# Patient Record
Sex: FEMALE | Race: OTHER | NOT HISPANIC OR LATINO | URBAN - METROPOLITAN AREA
[De-identification: names, ages, dates, MRNs, and addresses within clinical notes are randomized per-mention and may not be internally consistent; named-entity substitution may affect disease eponyms.]

---

## 2020-03-01 ENCOUNTER — OUTPATIENT (OUTPATIENT)
Dept: OUTPATIENT SERVICES | Facility: HOSPITAL | Age: 55
LOS: 1 days | End: 2020-03-01

## 2020-03-10 RX ORDER — CIPROFLOXACIN LACTATE 400MG/40ML
1 VIAL (ML) INTRAVENOUS
Qty: 0 | Refills: 0 | DISCHARGE
Start: 2020-03-10

## 2020-03-18 ENCOUNTER — INPATIENT (INPATIENT)
Facility: HOSPITAL | Age: 55
LOS: 1 days | Discharge: HOME | End: 2020-03-20
Attending: STUDENT IN AN ORGANIZED HEALTH CARE EDUCATION/TRAINING PROGRAM | Admitting: STUDENT IN AN ORGANIZED HEALTH CARE EDUCATION/TRAINING PROGRAM
Payer: MEDICAID

## 2020-03-18 VITALS
OXYGEN SATURATION: 100 % | RESPIRATION RATE: 16 BRPM | HEART RATE: 100 BPM | DIASTOLIC BLOOD PRESSURE: 76 MMHG | TEMPERATURE: 99 F | SYSTOLIC BLOOD PRESSURE: 123 MMHG

## 2020-03-18 DIAGNOSIS — E03.9 HYPOTHYROIDISM, UNSPECIFIED: ICD-10-CM

## 2020-03-18 DIAGNOSIS — D72.825 BANDEMIA: ICD-10-CM

## 2020-03-18 DIAGNOSIS — D21.9 BENIGN NEOPLASM OF CONNECTIVE AND OTHER SOFT TISSUE, UNSPECIFIED: Chronic | ICD-10-CM

## 2020-03-18 DIAGNOSIS — Z90.49 ACQUIRED ABSENCE OF OTHER SPECIFIED PARTS OF DIGESTIVE TRACT: Chronic | ICD-10-CM

## 2020-03-18 DIAGNOSIS — Z29.9 ENCOUNTER FOR PROPHYLACTIC MEASURES, UNSPECIFIED: ICD-10-CM

## 2020-03-18 DIAGNOSIS — Z02.9 ENCOUNTER FOR ADMINISTRATIVE EXAMINATIONS, UNSPECIFIED: ICD-10-CM

## 2020-03-18 DIAGNOSIS — J18.9 PNEUMONIA, UNSPECIFIED ORGANISM: ICD-10-CM

## 2020-03-18 PROBLEM — Z00.00 ENCOUNTER FOR PREVENTIVE HEALTH EXAMINATION: Status: ACTIVE | Noted: 2020-03-18

## 2020-03-18 LAB
ALBUMIN SERPL ELPH-MCNC: 4.2 G/DL — SIGNIFICANT CHANGE UP (ref 3.3–5)
ALP SERPL-CCNC: 79 U/L — SIGNIFICANT CHANGE UP (ref 40–120)
ALT FLD-CCNC: 10 U/L — SIGNIFICANT CHANGE UP (ref 4–33)
ANION GAP SERPL CALC-SCNC: 15 MMO/L — HIGH (ref 7–14)
AST SERPL-CCNC: 23 U/L — SIGNIFICANT CHANGE UP (ref 4–32)
B PERT DNA SPEC QL NAA+PROBE: NOT DETECTED — SIGNIFICANT CHANGE UP
BASE EXCESS BLDV CALC-SCNC: 1.5 MMOL/L — SIGNIFICANT CHANGE UP
BASOPHILS # BLD AUTO: 0.01 K/UL — SIGNIFICANT CHANGE UP (ref 0–0.2)
BASOPHILS NFR BLD AUTO: 0.2 % — SIGNIFICANT CHANGE UP (ref 0–2)
BASOPHILS NFR SPEC: 0 % — SIGNIFICANT CHANGE UP (ref 0–2)
BILIRUB SERPL-MCNC: 0.4 MG/DL — SIGNIFICANT CHANGE UP (ref 0.2–1.2)
BLASTS # FLD: 0 % — SIGNIFICANT CHANGE UP (ref 0–0)
BLOOD GAS VENOUS - CREATININE: 1.1 MG/DL — SIGNIFICANT CHANGE UP (ref 0.5–1.3)
BUN SERPL-MCNC: 11 MG/DL — SIGNIFICANT CHANGE UP (ref 7–23)
C PNEUM DNA SPEC QL NAA+PROBE: NOT DETECTED — SIGNIFICANT CHANGE UP
CALCIUM SERPL-MCNC: 9.2 MG/DL — SIGNIFICANT CHANGE UP (ref 8.4–10.5)
CHLORIDE BLDV-SCNC: 104 MMOL/L — SIGNIFICANT CHANGE UP (ref 96–108)
CHLORIDE SERPL-SCNC: 100 MMOL/L — SIGNIFICANT CHANGE UP (ref 98–107)
CO2 SERPL-SCNC: 23 MMOL/L — SIGNIFICANT CHANGE UP (ref 22–31)
CREAT SERPL-MCNC: 0.9 MG/DL — SIGNIFICANT CHANGE UP (ref 0.5–1.3)
CRP SERPL-MCNC: 5.2 MG/L — HIGH
EOSINOPHIL # BLD AUTO: 0 K/UL — SIGNIFICANT CHANGE UP (ref 0–0.5)
EOSINOPHIL NFR BLD AUTO: 0 % — SIGNIFICANT CHANGE UP (ref 0–6)
EOSINOPHIL NFR FLD: 0 % — SIGNIFICANT CHANGE UP (ref 0–6)
ERYTHROCYTE [SEDIMENTATION RATE] IN BLOOD: 34 MM/HR — HIGH (ref 4–25)
FLUAV H1 2009 PAND RNA SPEC QL NAA+PROBE: NOT DETECTED — SIGNIFICANT CHANGE UP
FLUAV H1 RNA SPEC QL NAA+PROBE: NOT DETECTED — SIGNIFICANT CHANGE UP
FLUAV H3 RNA SPEC QL NAA+PROBE: NOT DETECTED — SIGNIFICANT CHANGE UP
FLUAV SUBTYP SPEC NAA+PROBE: NOT DETECTED — SIGNIFICANT CHANGE UP
FLUBV RNA SPEC QL NAA+PROBE: NOT DETECTED — SIGNIFICANT CHANGE UP
GAS PNL BLDV: 140 MMOL/L — SIGNIFICANT CHANGE UP (ref 136–146)
GIANT PLATELETS BLD QL SMEAR: PRESENT — SIGNIFICANT CHANGE UP
GLUCOSE BLDV-MCNC: 132 MG/DL — HIGH (ref 70–99)
GLUCOSE SERPL-MCNC: 134 MG/DL — HIGH (ref 70–99)
HADV DNA SPEC QL NAA+PROBE: NOT DETECTED — SIGNIFICANT CHANGE UP
HCO3 BLDV-SCNC: 26 MMOL/L — SIGNIFICANT CHANGE UP (ref 20–27)
HCOV PNL SPEC NAA+PROBE: SIGNIFICANT CHANGE UP
HCT VFR BLD CALC: 36.8 % — SIGNIFICANT CHANGE UP (ref 34.5–45)
HCT VFR BLDV CALC: 35.2 % — SIGNIFICANT CHANGE UP (ref 34.5–45)
HGB BLD-MCNC: 12.1 G/DL — SIGNIFICANT CHANGE UP (ref 11.5–15.5)
HGB BLDV-MCNC: 11.4 G/DL — LOW (ref 11.5–15.5)
HMPV RNA SPEC QL NAA+PROBE: NOT DETECTED — SIGNIFICANT CHANGE UP
HPIV1 RNA SPEC QL NAA+PROBE: NOT DETECTED — SIGNIFICANT CHANGE UP
HPIV2 RNA SPEC QL NAA+PROBE: NOT DETECTED — SIGNIFICANT CHANGE UP
HPIV3 RNA SPEC QL NAA+PROBE: NOT DETECTED — SIGNIFICANT CHANGE UP
HPIV4 RNA SPEC QL NAA+PROBE: NOT DETECTED — SIGNIFICANT CHANGE UP
IMM GRANULOCYTES NFR BLD AUTO: 0 % — SIGNIFICANT CHANGE UP (ref 0–1.5)
LACTATE BLDV-MCNC: 1.6 MMOL/L — SIGNIFICANT CHANGE UP (ref 0.5–2)
LYMPHOCYTES # BLD AUTO: 1.31 K/UL — SIGNIFICANT CHANGE UP (ref 1–3.3)
LYMPHOCYTES # BLD AUTO: 30.1 % — SIGNIFICANT CHANGE UP (ref 13–44)
LYMPHOCYTES NFR SPEC AUTO: 20.6 % — SIGNIFICANT CHANGE UP (ref 13–44)
MCHC RBC-ENTMCNC: 29.9 PG — SIGNIFICANT CHANGE UP (ref 27–34)
MCHC RBC-ENTMCNC: 32.9 % — SIGNIFICANT CHANGE UP (ref 32–36)
MCV RBC AUTO: 90.9 FL — SIGNIFICANT CHANGE UP (ref 80–100)
METAMYELOCYTES # FLD: 0 % — SIGNIFICANT CHANGE UP (ref 0–1)
MONOCYTES # BLD AUTO: 0.33 K/UL — SIGNIFICANT CHANGE UP (ref 0–0.9)
MONOCYTES NFR BLD AUTO: 7.6 % — SIGNIFICANT CHANGE UP (ref 2–14)
MONOCYTES NFR BLD: 3.9 % — SIGNIFICANT CHANGE UP (ref 2–9)
MYELOCYTES NFR BLD: 0 % — SIGNIFICANT CHANGE UP (ref 0–0)
NEUTROPHIL AB SER-ACNC: 52.9 % — SIGNIFICANT CHANGE UP (ref 43–77)
NEUTROPHILS # BLD AUTO: 2.7 K/UL — SIGNIFICANT CHANGE UP (ref 1.8–7.4)
NEUTROPHILS NFR BLD AUTO: 62.1 % — SIGNIFICANT CHANGE UP (ref 43–77)
NEUTS BAND # BLD: 14.7 % — HIGH (ref 0–6)
NRBC # FLD: 0 K/UL — SIGNIFICANT CHANGE UP (ref 0–0)
OTHER - HEMATOLOGY %: 0 — SIGNIFICANT CHANGE UP
PCO2 BLDV: 49 MMHG — SIGNIFICANT CHANGE UP (ref 41–51)
PH BLDV: 7.35 PH — SIGNIFICANT CHANGE UP (ref 7.32–7.43)
PLATELET # BLD AUTO: 219 K/UL — SIGNIFICANT CHANGE UP (ref 150–400)
PLATELET COUNT - ESTIMATE: NORMAL — SIGNIFICANT CHANGE UP
PMV BLD: 9.9 FL — SIGNIFICANT CHANGE UP (ref 7–13)
PO2 BLDV: 33 MMHG — LOW (ref 35–40)
POTASSIUM BLDV-SCNC: 3.1 MMOL/L — LOW (ref 3.4–4.5)
POTASSIUM SERPL-MCNC: 3.8 MMOL/L — SIGNIFICANT CHANGE UP (ref 3.5–5.3)
POTASSIUM SERPL-SCNC: 3.8 MMOL/L — SIGNIFICANT CHANGE UP (ref 3.5–5.3)
PROMYELOCYTES # FLD: 0 % — SIGNIFICANT CHANGE UP (ref 0–0)
PROT SERPL-MCNC: 7.4 G/DL — SIGNIFICANT CHANGE UP (ref 6–8.3)
RBC # BLD: 4.05 M/UL — SIGNIFICANT CHANGE UP (ref 3.8–5.2)
RBC # FLD: 13.5 % — SIGNIFICANT CHANGE UP (ref 10.3–14.5)
REVIEW TO FOLLOW: YES — SIGNIFICANT CHANGE UP
RSV RNA SPEC QL NAA+PROBE: NOT DETECTED — SIGNIFICANT CHANGE UP
RV+EV RNA SPEC QL NAA+PROBE: NOT DETECTED — SIGNIFICANT CHANGE UP
SAO2 % BLDV: 61.5 % — SIGNIFICANT CHANGE UP (ref 60–85)
SMUDGE CELLS # BLD: PRESENT — SIGNIFICANT CHANGE UP
SODIUM SERPL-SCNC: 138 MMOL/L — SIGNIFICANT CHANGE UP (ref 135–145)
VARIANT LYMPHS # BLD: 6.9 % — SIGNIFICANT CHANGE UP
WBC # BLD: 4.35 K/UL — SIGNIFICANT CHANGE UP (ref 3.8–10.5)
WBC # FLD AUTO: 4.35 K/UL — SIGNIFICANT CHANGE UP (ref 3.8–10.5)

## 2020-03-18 PROCEDURE — 71045 X-RAY EXAM CHEST 1 VIEW: CPT | Mod: 26

## 2020-03-18 PROCEDURE — 99223 1ST HOSP IP/OBS HIGH 75: CPT

## 2020-03-18 RX ORDER — LEVOTHYROXINE SODIUM 125 MCG
50 TABLET ORAL DAILY
Refills: 0 | Status: DISCONTINUED | OUTPATIENT
Start: 2020-03-18 | End: 2020-03-20

## 2020-03-18 RX ORDER — ACETAMINOPHEN 500 MG
650 TABLET ORAL EVERY 6 HOURS
Refills: 0 | Status: DISCONTINUED | OUTPATIENT
Start: 2020-03-18 | End: 2020-03-20

## 2020-03-18 RX ORDER — ALBUTEROL 90 UG/1
1 AEROSOL, METERED ORAL ONCE
Refills: 0 | Status: COMPLETED | OUTPATIENT
Start: 2020-03-18 | End: 2020-03-18

## 2020-03-18 RX ORDER — AZITHROMYCIN 500 MG/1
500 TABLET, FILM COATED ORAL ONCE
Refills: 0 | Status: COMPLETED | OUTPATIENT
Start: 2020-03-18 | End: 2020-03-18

## 2020-03-18 RX ORDER — ALBUTEROL 90 UG/1
2 AEROSOL, METERED ORAL EVERY 6 HOURS
Refills: 0 | Status: DISCONTINUED | OUTPATIENT
Start: 2020-03-18 | End: 2020-03-20

## 2020-03-18 RX ORDER — ACETAMINOPHEN 500 MG
650 TABLET ORAL ONCE
Refills: 0 | Status: COMPLETED | OUTPATIENT
Start: 2020-03-18 | End: 2020-03-18

## 2020-03-18 RX ORDER — AZITHROMYCIN 500 MG/1
500 TABLET, FILM COATED ORAL EVERY 24 HOURS
Refills: 0 | Status: DISCONTINUED | OUTPATIENT
Start: 2020-03-19 | End: 2020-03-20

## 2020-03-18 RX ORDER — CEFTRIAXONE 500 MG/1
1000 INJECTION, POWDER, FOR SOLUTION INTRAMUSCULAR; INTRAVENOUS EVERY 24 HOURS
Refills: 0 | Status: DISCONTINUED | OUTPATIENT
Start: 2020-03-19 | End: 2020-03-20

## 2020-03-18 RX ORDER — CEFTRIAXONE 500 MG/1
1000 INJECTION, POWDER, FOR SOLUTION INTRAMUSCULAR; INTRAVENOUS ONCE
Refills: 0 | Status: COMPLETED | OUTPATIENT
Start: 2020-03-18 | End: 2020-03-18

## 2020-03-18 RX ADMIN — Medication 650 MILLIGRAM(S): at 15:47

## 2020-03-18 RX ADMIN — CEFTRIAXONE 100 MILLIGRAM(S): 500 INJECTION, POWDER, FOR SOLUTION INTRAMUSCULAR; INTRAVENOUS at 19:00

## 2020-03-18 RX ADMIN — AZITHROMYCIN 255 MILLIGRAM(S): 500 TABLET, FILM COATED ORAL at 19:26

## 2020-03-18 RX ADMIN — ALBUTEROL 1 PUFF(S): 90 AEROSOL, METERED ORAL at 16:45

## 2020-03-18 RX ADMIN — Medication 650 MILLIGRAM(S): at 14:47

## 2020-03-18 RX ADMIN — ALBUTEROL 2 PUFF(S): 90 AEROSOL, METERED ORAL at 22:24

## 2020-03-18 NOTE — ED PROVIDER NOTE - PROGRESS NOTE DETAILS
Patient states she feels short of breath. Will try albuterol MDI. [place]: Pt. w/ ~15% bands Only able to walk 20 feet before feeling dizzy and closing eyes. Had to turn around. Will admit. Give Abx.

## 2020-03-18 NOTE — H&P ADULT - PROBLEM SELECTOR PLAN 3
- management as above  - daily CBC - management as above for R/O Viral pneumonia  - daily CBC with diff

## 2020-03-18 NOTE — ED ADULT NURSE NOTE - NSIMPLEMENTINTERV_GEN_ALL_ED
Implemented All Universal Safety Interventions:  [place] to call system. Call bell, personal items and telephone within reach. Instruct patient to call for assistance. Room bathroom lighting operational. Non-slip footwear when patient is off stretcher. Physically safe environment: no spills, clutter or unnecessary equipment. Stretcher in lowest position, wheels locked, appropriate side rails in place.

## 2020-03-18 NOTE — ED PROVIDER NOTE - OBJECTIVE STATEMENT
Patient is a 55 yo F with no chronic medical problems, non-smoker, here for fever and shortness of breath. + travel to [facility], returned 2/15/20. She developed fever and cough on Friday (6 days ago). + nausea, cough. She went to urgent care in [place] yesterday and was swabbed for COVID 19, was told to stay home for 7 days but now comes in because she cannot breath. She states she was started on antibiotics, but cannot recall the name.

## 2020-03-18 NOTE — H&P ADULT - PROBLEM SELECTOR PLAN 1
Patient presents with reported fever, SOB and bandemia and close contact with someone who is currently undergoing PNA treatment, concerning for viral PNA. RVP negative and CXR clear. s/p Azithro and CTX in the ED  - given bandemia of 14.7, will c/w Azithro and CTX for CAP  - f/u COVID PCR  - PRN NC O2  - PRN Tylenol for fever  - if febrile -> will obtain UA, UCx and BCx Patient presents with reported fever, SOB and bandemia and close contact with someone who is currently undergoing PNA treatment, concerning for viral PNA. RVP negative and CXR clear. s/p Azithro and CTX in the ED  - given bandemia of 14.7, will c/w Azithro and CTX for CAP  - f/u COVID PCR  - PRN NC O2  - PRN Ventolin for SOB  - PRN Tylenol for fever  - if febrile -> will obtain UA, UCx and BCx Patient presents with reported fever, SOB and bandemia and close contact with someone who is currently undergoing PNA treatment, concerning for viral PNA. RVP negative and CXR clear. s/p Azithro and CTX in the ED  - given bandemia of 14.7, will c/w Azithro and CTX for CAP  - f/u COVID PCR  - ordered blood culture, UA, UCx  - PRN NC O2  - PRN Ventolin for SOB  - PRN Tylenol for fever Patient presents with reported fevers, cough, SOB, bandemia, and close contact with someone who is currently undergoing PNA treatment, concerning for COVID-19. RVP negative and CXR clear. S/p Azithro and CTX in the ED.  - Given bandemia of 14.7, will c/w Azithro and CTX for CAP  - F/u COVID-19 testing  - Ordered blood cultures, UA, UCx  - F/u CT chest  - PRN NC O2, avoid HFNC and NPPV pending COVID-19 rule out. Monitor respiratory status closely.  - PRN Ventolin for SOB  - PRN Tylenol for fever  - PRN Tessalon Perle for cough  - Isolation precautions

## 2020-03-18 NOTE — H&P ADULT - ASSESSMENT
kaci is a 53 yo F with PMHx of hypothyroidism presents for shortness of breath, cough and reported fever, admitted for COVID rule out. Patient is a 55 yo F with PMHx of hypothyroidism presents for shortness of breath, cough, and reported fevers, admitted for COVID-19 rule out.

## 2020-03-18 NOTE — H&P ADULT - PROBLEM SELECTOR PLAN 4
DVT: SCD for now, low IMPROVE score  Diet: Regular DVT ppx: SCDs for now, low IMPROVE score  Diet: Regular

## 2020-03-18 NOTE — H&P ADULT - HISTORY OF PRESENT ILLNESS
Patient is a 53 yo F with no PMHx presents for fever and shortness of breath Patient is a 55 yo F with PMHx of hypothyroidism presents for shortness of breath. Reports that she started experiencing fever up to 100.4 since last Friday with associated cough. Went to urgent care at [place] and was swabbed for COVID, sent home with antibiotics (unclear which one) and was told to self quarantined. Reports worsening shortness of breath with minimal exertion today with associated nausea. Hence came to [facility] ED for further evaluation.   Reports that her  is currently hospitalized at [place] for pneumonia, and had similar symptoms has she does. Recent returned from [facility] on 2/15/20. Denies any symptoms while there.   ED vitals stable. Per ED attending, when attempted to walk the patient she became acutely short of breath and dizzy (was not able to get a saturation before she needed to go back to bed). Labs with bandemia 14/7%. CXR clear. RVP negative. ESR and CRP elevated. Admitted for COVID rule out. Patient is a 55 yo F with PMHx of hypothyroidism presents for shortness of breath. Reports that she started experiencing fever up to 100.4 since last Friday with associated cough. Went to urgent care at [place] and was swabbed for COVID-19, sent home with antibiotics (unclear which one) and was told to self quarantine. Reports worsening shortness of breath with minimal exertion today with associated nausea. Hence came to [facility] ED for further evaluation.   Reports that her  is currently hospitalized at [place] for pneumonia, and had similar symptoms as she does. Recent returned from [facility] on 2/15/20. Denies any symptoms while there.   ED vitals stable. Per ED attending, when attempted to walk the patient she became acutely short of breath and dizzy (was not able to get a saturation before she needed to go back to bed). Labs with bandemia 14.7%. CXR clear. RVP negative. ESR and CRP elevated. Admitted for COVID-19 rule out.

## 2020-03-18 NOTE — H&P ADULT - NSICDXPASTSURGICALHX_GEN_ALL_CORE_FT
PAST SURGICAL HISTORY:  History of cholecystectomy PAST SURGICAL HISTORY:  Fibroid     History of cholecystectomy

## 2020-03-18 NOTE — ED ADULT NURSE NOTE - OBJECTIVE STATEMENT
Receive pt. in ER Intake room 3 alert and oriented x 4, presenting to the ER with complaints of fever, cough, weakness and body aches. Pt. stated " I have been feeling sick for a week I went [facility] the doctor give me antibiotics but it is not working". Medicated as ordered, labs sent, pt. has intermittent non productive cough, will continue to monitor.

## 2020-03-18 NOTE — H&P ADULT - NSHPREVIEWOFSYSTEMS_GEN_ALL_CORE
REVIEW OF SYSTEMS:    CONSTITUTIONAL: No weakness, fevers or chills  EYES: No vertigo or throat pain  ENT: No visual changes, eye pain  MOUTH: moist winter mucosal, no mouth ulcers  NECK: No pain or stiffness  RESPIRATORY: No cough, wheezing, hemoptysis; No shortness of breath  CARDIOVASCULAR: No chest pain or palpitations  GASTROINTESTINAL: No abdominal or epigastric pain. No nausea, vomiting, or hematemesis; No diarrhea or constipation. No melena or hematochezia.  GENITOURINARY: No dysuria, frequency or hematuria  NEUROLOGICAL: No numbness or weakness  SKIN: No itching, rashes REVIEW OF SYSTEMS:    CONSTITUTIONAL: No weakness, + fevers or chills  EYES: No vertigo or throat pain  ENT: No visual changes, eye pain  MOUTH: moist winter mucosal, no mouth ulcers  NECK: No pain or stiffness  RESPIRATORY: + dry cough and shortness of breath  CARDIOVASCULAR: + chest tightness, no palpitations  GASTROINTESTINAL: + epigastric pain and nausea. No vomiting, or hematemesis; No diarrhea or constipation. No melena or hematochezia.  GENITOURINARY: No dysuria, frequency or hematuria  NEUROLOGICAL: No numbness or weakness  SKIN: No itching, rashes Constitutional: +Fevers and chills. No weakness or weight loss.   Eyes: No visual changes, double vision, or eye pain  Ears, Nose, Mouth, Throat: No runny nose, sinus pain, ear pain, tinnitus, sore throat, dysphagia, or odynophagia  Cardiovascular: +Chest tightness. No chest pain, palpitations, or LE edema.  Respiratory: +Nonproductive cough. +SOB. No wheezing or hemoptysis.  Gastrointestinal: +Epigastric pain and nausea. No vomiting, diarrhea/constipation, hematemesis, BRBPR, or melena.  Genitourinary: No dysuria, frequency, urgency, or hematuria  Musculoskeletal: No joint pain, joint swelling, or decreased ROM  Skin: No pruritus, rashes, lesions, or wounds  Neurologic: No seizures, headache, paresthesias, numbness, or limb weakness  Psychiatric: No depression, anxiety, or agitation  Endocrine: No heat/cold intolerance, mood swings, sweats, polydipsia, or polyuria  Hematologic/lymphatic: No purpura, petechia, or prolonged or excessive bleeding after dental extraction / injury  Allergic/Immunologic: No anaphylaxis or allergic response to materials, foods, animals    Positives and pertinent negatives noted and all other systems negative.

## 2020-03-18 NOTE — H&P ADULT - ATTENDING COMMENTS
53 yo F with PMHx of hypothyroidism presents for shortness of breath, cough, and reported fevers, admitted for COVID-19 rule out. On ceftriaxone and azithromycin given bandemia on presentation. F/u CT chest and COVID-19 testing.

## 2020-03-18 NOTE — ED ADULT TRIAGE NOTE - CHIEF COMPLAINT QUOTE
patient c/o fevers, cough , fatigue.  Had work up done at [facility] yesterday for same, had flu/corona swab sent.

## 2020-03-18 NOTE — H&P ADULT - PROBLEM SELECTOR PLAN 5
Dispo:   1.  Name of PCP:   2.  PCP Contacted on Admission: [ ] Y    [ ] N    3.  PCP contacted at Discharge: [ ] Y    [ ] N    [ ] N/A  4.  Post-Discharge Appointment Date and Location:  5.  Summary of Handoff given to PCP: Dispo:   1.  Name of PCP: Unknown  2.  PCP Contacted on Admission: [ ] Y    [X] N    3.  PCP contacted at Discharge: [ ] Y    [ ] N    [ ] N/A  4.  Post-Discharge Appointment Date and Location:  5.  Summary of Handoff given to PCP:

## 2020-03-18 NOTE — H&P ADULT - NSHPPHYSICALEXAM_GEN_ALL_CORE
Vital Signs Last 24 Hrs  T(C): 37.4 (18 Mar 2020 19:26), Max: 37.6 (18 Mar 2020 16:45)  T(F): 99.4 (18 Mar 2020 19:26), Max: 99.6 (18 Mar 2020 16:45)  HR: 88 (18 Mar 2020 19:26) (86 - 100)  BP: 131/66 (18 Mar 2020 19:26) (123/76 - 131/66)  BP(mean): --  RR: 16 (18 Mar 2020 19:26) (16 - 16)  SpO2: 99% (18 Mar 2020 19:26) (98% - 100%)    PHYSICAL EXAM:  GENERAL: NAD, well-developed  HEAD:  Atraumatic, Normocephalic  EYES: EOMI, PERRLA, conjunctiva and sclera clear  NECK: Supple, No JVD  CHEST/LUNG: Clear to auscultation bilaterally; No wheeze  HEART: Regular rate and rhythm; No murmurs, rubs, or gallops  ABDOMEN: Soft, Nontender, Nondistended; Bowel sounds present  EXTREMITIES:  2+ Peripheral Pulses, No clubbing, cyanosis, or edema  PSYCH: AAOx3  NEUROLOGY: non-focal  SKIN: No rashes or lesions Vital Signs Last 24 Hrs  T(C): 37.4 (18 Mar 2020 19:26), Max: 37.6 (18 Mar 2020 16:45)  T(F): 99.4 (18 Mar 2020 19:26), Max: 99.6 (18 Mar 2020 16:45)  HR: 88 (18 Mar 2020 19:26) (86 - 100)  BP: 131/66 (18 Mar 2020 19:26) (123/76 - 131/66)  BP(mean): --  RR: 16 (18 Mar 2020 19:26) (16 - 16)  SpO2: 99% (18 Mar 2020 19:26) (98% - 100%)    PHYSICAL EXAM:  GENERAL: NAD, well-developed  HEAD:  Atraumatic, Normocephalic  EYES: EOMI, PERRLA, conjunctiva and sclera clear  NECK: Supple, No JVD  CHEST/LUNG: Tight breath sound; No rales, wheeze  HEART: Regular rate and rhythm; No murmurs, rubs, or gallops  ABDOMEN: Soft, Nontender, Nondistended; Bowel sounds present  EXTREMITIES:  2+ Peripheral Pulses, No clubbing, cyanosis, or edema  PSYCH: AAOx3  NEUROLOGY: non-focal  SKIN: No rashes or lesions Vital Signs Last 24 Hrs  T(C): 37.4 (18 Mar 2020 19:26), Max: 37.6 (18 Mar 2020 16:45)  T(F): 99.4 (18 Mar 2020 19:26), Max: 99.6 (18 Mar 2020 16:45)  HR: 88 (18 Mar 2020 19:26) (86 - 100)  BP: 131/66 (18 Mar 2020 19:26) (123/76 - 131/66)  BP(mean): --  RR: 16 (18 Mar 2020 19:26) (16 - 16)  SpO2: 99% (18 Mar 2020 19:26) (98% - 100%)    PHYSICAL EXAM:  GENERAL: Awake and alert, NAD but ill appearing  HEAD: Atraumatic, normocephalic  EYES: EOMI, PERRL, conjunctiva and sclera clear  NECK: Supple, full ROM, no JVD, trachea midline  CHEST/LUNG: Nonlabored breathing. Tight breath sounds. No rales or wheezes.   HEART: Regular rate and rhythm. Normal S1 and S2. No murmurs, rubs, or gallops. No LE edema b/l.   ABDOMEN: BS+, soft, nontender, nondistended  EXTREMITIES: 2+ peripheral pulses b/l. No cyanosis.  PSYCH: Full affect, normal mood, speech fluent  NEUROLOGY: AAOx3, non-focal, motor strength and tactile sensation intact in all extremities  SKIN: No rashes or lesions

## 2020-03-18 NOTE — H&P ADULT - NSHPSOCIALHISTORY_GEN_ALL_CORE
Lives at home with , works for [employer]. Denies smoking, ETOH or illicit drug use. Lives at home with , works for [employer]. Denies smoking, ETOH use, or illicit drug use.

## 2020-03-19 DIAGNOSIS — A41.9 SEPSIS, UNSPECIFIED ORGANISM: ICD-10-CM

## 2020-03-19 LAB
APPEARANCE UR: CLEAR — SIGNIFICANT CHANGE UP
BASOPHILS # BLD AUTO: 0.01 K/UL — SIGNIFICANT CHANGE UP (ref 0–0.2)
BASOPHILS NFR BLD AUTO: 0.2 % — SIGNIFICANT CHANGE UP (ref 0–2)
BILIRUB UR-MCNC: NEGATIVE — SIGNIFICANT CHANGE UP
BLOOD UR QL VISUAL: SIGNIFICANT CHANGE UP
COLOR SPEC: SIGNIFICANT CHANGE UP
CRP SERPL-MCNC: 4.6 MG/L — SIGNIFICANT CHANGE UP
EOSINOPHIL # BLD AUTO: 0 K/UL — SIGNIFICANT CHANGE UP (ref 0–0.5)
EOSINOPHIL NFR BLD AUTO: 0 % — SIGNIFICANT CHANGE UP (ref 0–6)
ERYTHROCYTE [SEDIMENTATION RATE] IN BLOOD: 34 MM/HR — HIGH (ref 4–25)
GLUCOSE UR-MCNC: NEGATIVE — SIGNIFICANT CHANGE UP
HCT VFR BLD CALC: 36.6 % — SIGNIFICANT CHANGE UP (ref 34.5–45)
HGB BLD-MCNC: 12 G/DL — SIGNIFICANT CHANGE UP (ref 11.5–15.5)
IMM GRANULOCYTES NFR BLD AUTO: 0.2 % — SIGNIFICANT CHANGE UP (ref 0–1.5)
KETONES UR-MCNC: NEGATIVE — SIGNIFICANT CHANGE UP
L PNEUMO AG UR QL: NEGATIVE — SIGNIFICANT CHANGE UP
LEUKOCYTE ESTERASE UR-ACNC: NEGATIVE — SIGNIFICANT CHANGE UP
LYMPHOCYTES # BLD AUTO: 1.27 K/UL — SIGNIFICANT CHANGE UP (ref 1–3.3)
LYMPHOCYTES # BLD AUTO: 27.4 % — SIGNIFICANT CHANGE UP (ref 13–44)
MCHC RBC-ENTMCNC: 29.6 PG — SIGNIFICANT CHANGE UP (ref 27–34)
MCHC RBC-ENTMCNC: 32.8 % — SIGNIFICANT CHANGE UP (ref 32–36)
MCV RBC AUTO: 90.1 FL — SIGNIFICANT CHANGE UP (ref 80–100)
MONOCYTES # BLD AUTO: 0.44 K/UL — SIGNIFICANT CHANGE UP (ref 0–0.9)
MONOCYTES NFR BLD AUTO: 9.5 % — SIGNIFICANT CHANGE UP (ref 2–14)
NEUTROPHILS # BLD AUTO: 2.91 K/UL — SIGNIFICANT CHANGE UP (ref 1.8–7.4)
NEUTROPHILS NFR BLD AUTO: 62.7 % — SIGNIFICANT CHANGE UP (ref 43–77)
NITRITE UR-MCNC: NEGATIVE — SIGNIFICANT CHANGE UP
NRBC # FLD: 0 K/UL — SIGNIFICANT CHANGE UP (ref 0–0)
PH UR: 6.5 — SIGNIFICANT CHANGE UP (ref 5–8)
PLATELET # BLD AUTO: 205 K/UL — SIGNIFICANT CHANGE UP (ref 150–400)
PMV BLD: 9.8 FL — SIGNIFICANT CHANGE UP (ref 7–13)
PROT UR-MCNC: NEGATIVE — SIGNIFICANT CHANGE UP
RBC # BLD: 4.06 M/UL — SIGNIFICANT CHANGE UP (ref 3.8–5.2)
RBC # FLD: 13.5 % — SIGNIFICANT CHANGE UP (ref 10.3–14.5)
RBC CASTS # UR COMP ASSIST: SIGNIFICANT CHANGE UP (ref 0–?)
SP GR SPEC: 1.01 — SIGNIFICANT CHANGE UP (ref 1–1.04)
TSH SERPL-MCNC: 8.19 UIU/ML — HIGH (ref 0.27–4.2)
UROBILINOGEN FLD QL: NORMAL — SIGNIFICANT CHANGE UP
WBC # BLD: 4.64 K/UL — SIGNIFICANT CHANGE UP (ref 3.8–10.5)
WBC # FLD AUTO: 4.64 K/UL — SIGNIFICANT CHANGE UP (ref 3.8–10.5)
WBC UR QL: SIGNIFICANT CHANGE UP (ref 0–?)

## 2020-03-19 PROCEDURE — 99233 SBSQ HOSP IP/OBS HIGH 50: CPT

## 2020-03-19 PROCEDURE — 71250 CT THORAX DX C-: CPT | Mod: 26

## 2020-03-19 RX ORDER — LEVOTHYROXINE SODIUM 125 MCG
1 TABLET ORAL
Qty: 0 | Refills: 0 | DISCHARGE

## 2020-03-19 RX ORDER — ACETAMINOPHEN 500 MG
650 TABLET ORAL ONCE
Refills: 0 | Status: COMPLETED | OUTPATIENT
Start: 2020-03-19 | End: 2020-03-19

## 2020-03-19 RX ORDER — ALBUTEROL 90 UG/1
0 AEROSOL, METERED ORAL
Qty: 0 | Refills: 0 | DISCHARGE

## 2020-03-19 RX ADMIN — Medication 650 MILLIGRAM(S): at 12:01

## 2020-03-19 RX ADMIN — ALBUTEROL 2 PUFF(S): 90 AEROSOL, METERED ORAL at 05:17

## 2020-03-19 RX ADMIN — Medication 650 MILLIGRAM(S): at 13:00

## 2020-03-19 RX ADMIN — Medication 650 MILLIGRAM(S): at 21:39

## 2020-03-19 RX ADMIN — AZITHROMYCIN 255 MILLIGRAM(S): 500 TABLET, FILM COATED ORAL at 17:50

## 2020-03-19 RX ADMIN — Medication 50 MICROGRAM(S): at 05:18

## 2020-03-19 RX ADMIN — CEFTRIAXONE 100 MILLIGRAM(S): 500 INJECTION, POWDER, FOR SOLUTION INTRAMUSCULAR; INTRAVENOUS at 17:18

## 2020-03-19 RX ADMIN — Medication 100 MILLIGRAM(S): at 21:08

## 2020-03-19 RX ADMIN — Medication 650 MILLIGRAM(S): at 21:09

## 2020-03-19 NOTE — PROGRESS NOTE ADULT - PROBLEM SELECTOR PLAN 4
Dispo:   1.  Name of PCP: Unknown  2.  PCP Contacted on Admission: [ ] Y    [X] N    3.  PCP contacted at Discharge: [ ] Y    [ ] N    [ ] N/A  4.  Post-Discharge Appointment Date and Location:  5.  Summary of Handoff given to PCP: Dispo:   1.  Name of PCP: Dr. Bardales ([phone])  2.  PCP Contacted on Admission: [x] Y, 3/19/20    3.  PCP contacted at Discharge: [ ] Y    [ ] N    [ ] N/A  4.  Post-Discharge Appointment Date and Location:  5.  Summary of Handoff given to PCP:

## 2020-03-19 NOTE — PROGRESS NOTE ADULT - PROBLEM SELECTOR PLAN 2
- c/w home levothyroxine  - TSH 8.19, likely 2/2 illness, ccan repeat outpt - c/w home levothyroxine  - TSH 8.19, likely 2/2 illness, can repeat outpt

## 2020-03-19 NOTE — PROGRESS NOTE ADULT - SUBJECTIVE AND OBJECTIVE BOX
[facility] Division of Hospital Medicine  Rhoda Atkinson DO  Pager (M-F, 8A-5P): [phone]      Patient is a 54y old  Female who presents with a chief complaint of SOB (18 Mar 2020 19:33)      SUBJECTIVE / OVERNIGHT EVENTS:  ADDITIONAL REVIEW OF SYSTEMS:    MEDICATIONS  (STANDING):  azithromycin  IVPB 500 milliGRAM(s) IV Intermittent every 24 hours  cefTRIAXone   IVPB 1000 milliGRAM(s) IV Intermittent every 24 hours  levothyroxine 50 MICROGram(s) Oral daily    MEDICATIONS  (PRN):  acetaminophen   Tablet .. 650 milliGRAM(s) Oral every 6 hours PRN Temp greater or equal to 38C (100.4F)  ALBUTerol    90 MICROgram(s) HFA Inhaler 2 Puff(s) Inhalation every 6 hours PRN Shortness of Breath and/or Wheezing  benzonatate 100 milliGRAM(s) Oral every 8 hours PRN Cough      CAPILLARY BLOOD GLUCOSE        I&O's Summary      PHYSICAL EXAM:  Vital Signs Last 24 Hrs  T(C): 37.2 (19 Mar 2020 05:18), Max: 37.6 (18 Mar 2020 16:45)  T(F): 98.9 (19 Mar 2020 05:18), Max: 99.6 (18 Mar 2020 16:45)  HR: 96 (19 Mar 2020 05:18) (82 - 100)  BP: 105/75 (19 Mar 2020 05:18) (105/75 - 139/83)  BP(mean): --  RR: 16 (19 Mar 2020 05:18) (16 - 17)  SpO2: 100% (19 Mar 2020 05:18) (98% - 100%)  CONSTITUTIONAL: NAD, well-developed, well-groomed  EYES: EOMI; conjunctiva and sclera clear  ENMT: Moist oral mucosa, no pharyngeal injection or exudates; normal dentition  NECK: Supple, no palpable masses; no thyromegaly  RESPIRATORY: Normal respiratory effort; lungs are clear to auscultation bilaterally  CARDIOVASCULAR: Regular rate and rhythm, normal S1 and S2, no murmur/rub/gallop; No lower extremity edema; Peripheral pulses are 2+ bilaterally  ABDOMEN: Nontender to palpation, normoactive bowel sounds, no rebound/guarding; No hepatosplenomegaly  MUSKULOSKELETAL:  no clubbing or cyanosis of digits; no joint swelling or tenderness to palpation  PSYCH: A+O to person, place, and time; affect appropriate  NEUROLOGY: CN 2-12 are intact and symmetric; no gross sensory deficits;   SKIN: No rashes; no palpable lesions    LABS:                        12.0   4.64  )-----------( 205      ( 19 Mar 2020 05:11 )             36.6     03-18    138  |  100  |  11  ----------------------------<  134<H>  3.8   |  23  |  0.90    Ca    9.2      18 Mar 2020 14:30    TPro  7.4  /  Alb  4.2  /  TBili  0.4  /  DBili  x   /  AST  23  /  ALT  10  /  AlkPhos  79            Urinalysis Basic - ( 19 Mar 2020 04:07 )    Color: LIGHT YELLOW / Appearance: CLEAR / S.014 / pH: 6.5  Gluc: NEGATIVE / Ketone: NEGATIVE  / Bili: NEGATIVE / Urobili: NORMAL   Blood: TRACE / Protein: NEGATIVE / Nitrite: NEGATIVE   Leuk Esterase: NEGATIVE / RBC: 0-2 / WBC 0-2   Sq Epi: x / Non Sq Epi: x / Bacteria: x          RADIOLOGY & ADDITIONAL TESTS:  Results Reviewed:   Imaging Personally Reviewed:  Electrocardiogram Personally Reviewed:    COORDINATION OF CARE:  Care Discussed with Consultants/Other Providers [Y/N]:  Prior or Outpatient Records Reviewed [Y/N]: [facility] Division of Hospital Medicine  Rhoda AtkinsonDO  Pager (M-F, 8A-5P): [phone]      Patient is a 54y old  Female who presents with a chief complaint of SOB (18 Mar 2020 19:33)      SUBJECTIVE / OVERNIGHT EVENTS: Pt seen in [facility], laying in stretcher. Reports headache and mildly productive cough. overall feels better since admission.  ADDITIONAL REVIEW OF SYSTEMS: +fevers, cough, headache. no cp, sob     MEDICATIONS  (STANDING):  azithromycin  IVPB 500 milliGRAM(s) IV Intermittent every 24 hours  cefTRIAXone   IVPB 1000 milliGRAM(s) IV Intermittent every 24 hours  levothyroxine 50 MICROGram(s) Oral daily    MEDICATIONS  (PRN):  acetaminophen   Tablet .. 650 milliGRAM(s) Oral every 6 hours PRN Temp greater or equal to 38C (100.4F)  ALBUTerol    90 MICROgram(s) HFA Inhaler 2 Puff(s) Inhalation every 6 hours PRN Shortness of Breath and/or Wheezing  benzonatate 100 milliGRAM(s) Oral every 8 hours PRN Cough      CAPILLARY BLOOD GLUCOSE        I&O's Summary      PHYSICAL EXAM:  Vital Signs Last 24 Hrs  T(C): 37.2 (19 Mar 2020 05:18), Max: 37.6 (18 Mar 2020 16:45)  T(F): 98.9 (19 Mar 2020 05:18), Max: 99.6 (18 Mar 2020 16:45)  HR: 96 (19 Mar 2020 05:18) (82 - 100)  BP: 105/75 (19 Mar 2020 05:18) (105/75 - 139/83)  BP(mean): --  RR: 16 (19 Mar 2020 05:18) (16 - 17)  SpO2: 100% (19 Mar 2020 05:18) (98% - 100%)  CONSTITUTIONAL: NAD, appears ill, but nontoxic   EYES: EOMI; conjunctiva and sclera clear  ENMT:+blue mask   NECK: Supple  RESPIRATORY: Normal respiratory effort; lungs are clear to auscultation bilaterally  CARDIOVASCULAR: Regular rate and rhythm, normal S1 and S2, no murmur/rub/gallop; No lower extremity edema  ABDOMEN: Nontender to palpation, normoactive bowel sounds, no rebound/guarding  MUSKULOSKELETAL:  no clubbing or cyanosis of digits; no joint swelling or tenderness to palpation  PSYCH: A+O to person, place, and time; affect appropriate  NEUROLOGY: CN 2-12 are intact and symmetric; no gross sensory deficits;   SKIN: No rashes; no palpable lesions    LABS:                        12.0   4.64  )-----------( 205      ( 19 Mar 2020 05:11 )             36.6     -18    138  |  100  |  11  ----------------------------<  134<H>  3.8   |  23  |  0.90    Ca    9.2      18 Mar 2020 14:30    TPro  7.4  /  Alb  4.2  /  TBili  0.4  /  DBili  x   /  AST  23  /  ALT  10  /  AlkPhos  79  -          Urinalysis Basic - ( 19 Mar 2020 04:07 )    Color: LIGHT YELLOW / Appearance: CLEAR / S.014 / pH: 6.5  Gluc: NEGATIVE / Ketone: NEGATIVE  / Bili: NEGATIVE / Urobili: NORMAL   Blood: TRACE / Protein: NEGATIVE / Nitrite: NEGATIVE   Leuk Esterase: NEGATIVE / RBC: 0-2 / WBC 0-2   Sq Epi: x / Non Sq Epi: x / Bacteria: x          RADIOLOGY & ADDITIONAL TESTS:  < from: CT Chest No Cont (20 @ 12:56) >    IMPRESSION:     1.  The bilateral opacities can occur in the setting of an atypical infection/viral pneumonia secondary to entities such as SARS-CoV-2/COVID 19.  2.  The findings were discussed with Dr. Atkinson.          MONCHO JOY M.D., ATTENDING RADIOLOGIST  This document has been electronically signed. Mar 19 2020  1:55PM        COORDINATION OF CARE:  Care Discussed with Consultants/Other Providers [Y/N]: Y  Prior or Outpatient Records Reviewed [Y/N]: Y

## 2020-03-19 NOTE — PROGRESS NOTE ADULT - ASSESSMENT
54F w/hypothyroidism presents for shortness of breath, cough, and reported fevers, admitted for COVID-19 rule out. 54F w/hypothyroidism presents for shortness of breath, cough, and reported fevers, admitted sepsis 2/2 presumed viral PNA and COVID-19 rule out.

## 2020-03-19 NOTE — PROGRESS NOTE ADULT - PROBLEM SELECTOR PLAN 1
Patient presents with reported fevers, cough, SOB, bandemia, and close contact with someone who is currently undergoing PNA treatment, concerning for COVID-19. RVP negative and CXR clear. S/p Azithro and CTX in the ED.  - Given bandemia of 14.7, will c/w Azithro and CTX for CAP  - F/u COVID-19 testing  - f/u blood cultures, UA, UCx, urine legionella   - F/u CT chest  - PRN Ventolin for SOB  - PRN Tylenol for fever  - PRN Tessalon Perle for cough  - Isolation precautions +fevers and bandemia on admission 2/2 presumed PNA  CT with b/l panlobar, predominantly peripheral groundglass opacities and areas of consolidation most pronounced in the left upper lobe  -C/w Cef/Azithro for CAP coverage  -f/u COVID PCR  -f/u blood cx and ucx  -RVP, Legionella negative

## 2020-03-20 ENCOUNTER — TRANSCRIPTION ENCOUNTER (OUTPATIENT)
Age: 55
End: 2020-03-20

## 2020-03-20 VITALS
TEMPERATURE: 100 F | RESPIRATION RATE: 18 BRPM | OXYGEN SATURATION: 96 % | SYSTOLIC BLOOD PRESSURE: 108 MMHG | HEART RATE: 97 BPM | DIASTOLIC BLOOD PRESSURE: 64 MMHG

## 2020-03-20 LAB
ALBUMIN SERPL ELPH-MCNC: 3.8 G/DL — SIGNIFICANT CHANGE UP (ref 3.3–5)
ALP SERPL-CCNC: 78 U/L — SIGNIFICANT CHANGE UP (ref 40–120)
ALT FLD-CCNC: 14 U/L — SIGNIFICANT CHANGE UP (ref 4–33)
ANION GAP SERPL CALC-SCNC: 14 MMO/L — SIGNIFICANT CHANGE UP (ref 7–14)
AST SERPL-CCNC: 28 U/L — SIGNIFICANT CHANGE UP (ref 4–32)
BASOPHILS # BLD AUTO: 0.01 K/UL — SIGNIFICANT CHANGE UP (ref 0–0.2)
BASOPHILS NFR BLD AUTO: 0.2 % — SIGNIFICANT CHANGE UP (ref 0–2)
BILIRUB SERPL-MCNC: 0.4 MG/DL — SIGNIFICANT CHANGE UP (ref 0.2–1.2)
BUN SERPL-MCNC: 10 MG/DL — SIGNIFICANT CHANGE UP (ref 7–23)
CALCIUM SERPL-MCNC: 9.2 MG/DL — SIGNIFICANT CHANGE UP (ref 8.4–10.5)
CHLORIDE SERPL-SCNC: 102 MMOL/L — SIGNIFICANT CHANGE UP (ref 98–107)
CO2 SERPL-SCNC: 21 MMOL/L — LOW (ref 22–31)
CREAT SERPL-MCNC: 0.8 MG/DL — SIGNIFICANT CHANGE UP (ref 0.5–1.3)
CRP SERPL-MCNC: 6.3 MG/L — HIGH
CULTURE RESULTS: NO GROWTH — SIGNIFICANT CHANGE UP
EOSINOPHIL # BLD AUTO: 0.01 K/UL — SIGNIFICANT CHANGE UP (ref 0–0.5)
EOSINOPHIL NFR BLD AUTO: 0.2 % — SIGNIFICANT CHANGE UP (ref 0–6)
ERYTHROCYTE [SEDIMENTATION RATE] IN BLOOD: 41 MM/HR — HIGH (ref 4–25)
FERRITIN SERPL-MCNC: 204.7 NG/ML — HIGH (ref 15–150)
GLUCOSE SERPL-MCNC: 106 MG/DL — HIGH (ref 70–99)
HCT VFR BLD CALC: 36.2 % — SIGNIFICANT CHANGE UP (ref 34.5–45)
HGB BLD-MCNC: 11.8 G/DL — SIGNIFICANT CHANGE UP (ref 11.5–15.5)
IMM GRANULOCYTES NFR BLD AUTO: 0.2 % — SIGNIFICANT CHANGE UP (ref 0–1.5)
LDH SERPL L TO P-CCNC: 199 U/L — SIGNIFICANT CHANGE UP (ref 135–225)
LYMPHOCYTES # BLD AUTO: 1.05 K/UL — SIGNIFICANT CHANGE UP (ref 1–3.3)
LYMPHOCYTES # BLD AUTO: 24.6 % — SIGNIFICANT CHANGE UP (ref 13–44)
MCHC RBC-ENTMCNC: 29.3 PG — SIGNIFICANT CHANGE UP (ref 27–34)
MCHC RBC-ENTMCNC: 32.6 % — SIGNIFICANT CHANGE UP (ref 32–36)
MCV RBC AUTO: 89.8 FL — SIGNIFICANT CHANGE UP (ref 80–100)
MONOCYTES # BLD AUTO: 0.35 K/UL — SIGNIFICANT CHANGE UP (ref 0–0.9)
MONOCYTES NFR BLD AUTO: 8.2 % — SIGNIFICANT CHANGE UP (ref 2–14)
NEUTROPHILS # BLD AUTO: 2.83 K/UL — SIGNIFICANT CHANGE UP (ref 1.8–7.4)
NEUTROPHILS NFR BLD AUTO: 66.6 % — SIGNIFICANT CHANGE UP (ref 43–77)
NRBC # FLD: 0 K/UL — SIGNIFICANT CHANGE UP (ref 0–0)
PLATELET # BLD AUTO: 203 K/UL — SIGNIFICANT CHANGE UP (ref 150–400)
PMV BLD: 9.7 FL — SIGNIFICANT CHANGE UP (ref 7–13)
POTASSIUM SERPL-MCNC: 4 MMOL/L — SIGNIFICANT CHANGE UP (ref 3.5–5.3)
POTASSIUM SERPL-SCNC: 4 MMOL/L — SIGNIFICANT CHANGE UP (ref 3.5–5.3)
PROCALCITONIN SERPL-MCNC: 0.06 NG/ML — SIGNIFICANT CHANGE UP (ref 0.02–0.1)
PROT SERPL-MCNC: 7.3 G/DL — SIGNIFICANT CHANGE UP (ref 6–8.3)
RBC # BLD: 4.03 M/UL — SIGNIFICANT CHANGE UP (ref 3.8–5.2)
RBC # FLD: 13.5 % — SIGNIFICANT CHANGE UP (ref 10.3–14.5)
SARS-COV-2 RNA SPEC QL NAA+PROBE: DETECTED
SODIUM SERPL-SCNC: 137 MMOL/L — SIGNIFICANT CHANGE UP (ref 135–145)
SPECIMEN SOURCE: SIGNIFICANT CHANGE UP
WBC # BLD: 4.26 K/UL — SIGNIFICANT CHANGE UP (ref 3.8–10.5)
WBC # FLD AUTO: 4.26 K/UL — SIGNIFICANT CHANGE UP (ref 3.8–10.5)

## 2020-03-20 PROCEDURE — 99239 HOSP IP/OBS DSCHRG MGMT >30: CPT

## 2020-03-20 RX ORDER — ALBUTEROL 90 UG/1
2 AEROSOL, METERED ORAL
Qty: 1 | Refills: 0
Start: 2020-03-20 | End: 2020-04-18

## 2020-03-20 RX ORDER — ACETAMINOPHEN 500 MG
2 TABLET ORAL
Qty: 0 | Refills: 0 | DISCHARGE
Start: 2020-03-20

## 2020-03-20 RX ADMIN — Medication 100 MILLIGRAM(S): at 05:42

## 2020-03-20 RX ADMIN — Medication 650 MILLIGRAM(S): at 08:48

## 2020-03-20 RX ADMIN — Medication 650 MILLIGRAM(S): at 06:27

## 2020-03-20 RX ADMIN — Medication 50 MICROGRAM(S): at 06:26

## 2020-03-20 RX ADMIN — ALBUTEROL 2 PUFF(S): 90 AEROSOL, METERED ORAL at 14:02

## 2020-03-20 RX ADMIN — Medication 650 MILLIGRAM(S): at 14:07

## 2020-03-20 RX ADMIN — Medication 100 MILLIGRAM(S): at 14:01

## 2020-03-20 NOTE — DISCHARGE NOTE NURSING/CASE MANAGEMENT/SOCIAL WORK - PATIENT PORTAL LINK FT
You can access the FollowMyHealth Patient Portal offered by [facility] by registering at the following website: [email or web address]. By joining [employer]’s FollowMyHealth portal, you will also be able to view your health information using other applications (apps) compatible with our system.

## 2020-03-20 NOTE — PROGRESS NOTE ADULT - ATTENDING COMMENTS
DC planning to home for self quarantine for 2 weeks. Pt has daughter at home to help her in case of emergencies or if symptoms worsen.     ~35 minutes

## 2020-03-20 NOTE — PROGRESS NOTE ADULT - SUBJECTIVE AND OBJECTIVE BOX
[facility]  Rhoda Atkinson DO  Pager (M-F, 8A-5P): [phone]      Patient is a 54y old  Female who presents with a chief complaint of SOB (19 Mar 2020 10:03)      SUBJECTIVE / OVERNIGHT EVENTS: Febrile to 101 yesterday afternoon around 12PM.   ADDITIONAL REVIEW OF SYSTEMS:     MEDICATIONS  (STANDING):  azithromycin  IVPB 500 milliGRAM(s) IV Intermittent every 24 hours  cefTRIAXone   IVPB 1000 milliGRAM(s) IV Intermittent every 24 hours  levothyroxine 50 MICROGram(s) Oral daily    MEDICATIONS  (PRN):  acetaminophen   Tablet .. 650 milliGRAM(s) Oral every 6 hours PRN Temp greater or equal to 38C (100.4F)  ALBUTerol    90 MICROgram(s) HFA Inhaler 2 Puff(s) Inhalation every 6 hours PRN Shortness of Breath and/or Wheezing  benzonatate 100 milliGRAM(s) Oral every 8 hours PRN Cough      CAPILLARY BLOOD GLUCOSE        I&O's Summary    19 Mar 2020 07:01  -  20 Mar 2020 07:00  --------------------------------------------------------  IN: 200 mL / OUT: 0 mL / NET: 200 mL        PHYSICAL EXAM:  Vital Signs Last 24 Hrs  T(C): 36.9 (20 Mar 2020 08:46), Max: 38.4 (19 Mar 2020 11:58)  T(F): 98.4 (20 Mar 2020 08:46), Max: 101.2 (19 Mar 2020 11:58)  HR: 83 (20 Mar 2020 08:46) (83 - 96)  BP: 103/59 (20 Mar 2020 08:46) (101/63 - 116/67)  BP(mean): --  RR: 16 (20 Mar 2020 08:46) (16 - 17)  SpO2: 97% (20 Mar 2020 08:46) (97% - 99%)  CONSTITUTIONAL: NAD, well-developed, well-groomed  EYES: EOMI; conjunctiva and sclera clear  ENMT: Moist oral mucosa, no pharyngeal injection or exudates; normal dentition  NECK: Supple, no palpable masses; no thyromegaly  RESPIRATORY: Normal respiratory effort; lungs are clear to auscultation bilaterally  CARDIOVASCULAR: Regular rate and rhythm, normal S1 and S2, no murmur/rub/gallop; No lower extremity edema; Peripheral pulses are 2+ bilaterally  ABDOMEN: Nontender to palpation, normoactive bowel sounds, no rebound/guarding; No hepatosplenomegaly  MUSKULOSKELETAL:  no clubbing or cyanosis of digits; no joint swelling or tenderness to palpation  PSYCH: A+O to person, place, and time; affect appropriate  NEUROLOGY: CN 2-12 are intact and symmetric; no gross sensory deficits;   SKIN: No rashes; no palpable lesions    LABS:                        11.8   4.26  )-----------( 203      ( 20 Mar 2020 05:31 )             36.2     03-20    137  |  102  |  10  ----------------------------<  106<H>  4.0   |  21<L>  |  0.80    Ca    9.2      20 Mar 2020 05:31    TPro  7.3  /  Alb  3.8  /  TBili  0.4  /  DBili  x   /  AST  28  /  ALT  14  /  AlkPhos  78  03-20          Urinalysis Basic - ( 19 Mar 2020 04:07 )    Color: LIGHT YELLOW / Appearance: CLEAR / S.014 / pH: 6.5  Gluc: NEGATIVE / Ketone: NEGATIVE  / Bili: NEGATIVE / Urobili: NORMAL   Blood: TRACE / Protein: NEGATIVE / Nitrite: NEGATIVE   Leuk Esterase: NEGATIVE / RBC: 0-2 / WBC 0-2   Sq Epi: x / Non Sq Epi: x / Bacteria: x        Culture - Urine (collected 19 Mar 2020 05:22)  Source: .Urine Clean Catch (Midstream)  Final Report (20 Mar 2020 01:09):    No growth    Culture - Blood (collected 19 Mar 2020 01:51)  Source: .Blood Blood-Venous  Preliminary Report (20 Mar 2020 02:01):    No growth to date.    Culture - Blood (collected 19 Mar 2020 01:51)  Source: .Blood Blood-Peripheral  Preliminary Report (20 Mar 2020 02:01):    No growth to date.        RADIOLOGY & ADDITIONAL TESTS:  Results Reviewed:   Imaging Personally Reviewed:  Electrocardiogram Personally Reviewed:    COORDINATION OF CARE:  Care Discussed with Consultants/Other Providers [Y/N]:  Prior or Outpatient Records Reviewed [Y/N]: [facility]  Rhoda Atkinson   Pager (M-F, 8A-5P): [phone]      Patient is a 54y old  Female who presents with a chief complaint of SOB (19 Mar 2020 10:03)      SUBJECTIVE / OVERNIGHT EVENTS: Febrile to 101 yesterday afternoon around 12PM.  still report nonproductive cough   ADDITIONAL REVIEW OF SYSTEMS: no sob, cp    MEDICATIONS  (STANDING):  azithromycin  IVPB 500 milliGRAM(s) IV Intermittent every 24 hours  cefTRIAXone   IVPB 1000 milliGRAM(s) IV Intermittent every 24 hours  levothyroxine 50 MICROGram(s) Oral daily    MEDICATIONS  (PRN):  acetaminophen   Tablet .. 650 milliGRAM(s) Oral every 6 hours PRN Temp greater or equal to 38C (100.4F)  ALBUTerol    90 MICROgram(s) HFA Inhaler 2 Puff(s) Inhalation every 6 hours PRN Shortness of Breath and/or Wheezing  benzonatate 100 milliGRAM(s) Oral every 8 hours PRN Cough      CAPILLARY BLOOD GLUCOSE        I&O's Summary    19 Mar 2020 07:01  -  20 Mar 2020 07:00  --------------------------------------------------------  IN: 200 mL / OUT: 0 mL / NET: 200 mL        PHYSICAL EXAM:  Vital Signs Last 24 Hrs  T(C): 36.9 (20 Mar 2020 08:46), Max: 38.4 (19 Mar 2020 11:58)  T(F): 98.4 (20 Mar 2020 08:46), Max: 101.2 (19 Mar 2020 11:58)  HR: 83 (20 Mar 2020 08:46) (83 - 96)  BP: 103/59 (20 Mar 2020 08:46) (101/63 - 116/67)  BP(mean): --  RR: 16 (20 Mar 2020 08:46) (16 - 17)  SpO2: 97% (20 Mar 2020 08:46) (97% - 99%)  CONSTITUTIONAL: NAD,  ENMT:+blue mask   NECK: Supple  RESPIRATORY: Normal respiratory effort; lungs are clear to auscultation bilaterally  CARDIOVASCULAR: Regular rate and rhythm, normal S1 and S2, no murmur/rub/gallop; No lower extremity edema  ABDOMEN: Nontender to palpation, normoactive bowel sounds, no rebound/guarding  MUSKULOSKELETAL:  no clubbing or cyanosis of digits; no joint swelling or tenderness to palpation  PSYCH: A+O to person, place, and time; affect appropriate  NEUROLOGY: CN 2-12 are intact and symmetric; no gross sensory deficits;     LABS:                        11.8   4.26  )-----------( 203      ( 20 Mar 2020 05:31 )             36.2     03-20    137  |  102  |  10  ----------------------------<  106<H>  4.0   |  21<L>  |  0.80    Ca    9.2      20 Mar 2020 05:31    TPro  7.3  /  Alb  3.8  /  TBili  0.4  /  DBili  x   /  AST  28  /  ALT  14  /  AlkPhos  78  03-20          Urinalysis Basic - ( 19 Mar 2020 04:07 )    Color: LIGHT YELLOW / Appearance: CLEAR / S.014 / pH: 6.5  Gluc: NEGATIVE / Ketone: NEGATIVE  / Bili: NEGATIVE / Urobili: NORMAL   Blood: TRACE / Protein: NEGATIVE / Nitrite: NEGATIVE   Leuk Esterase: NEGATIVE / RBC: 0-2 / WBC 0-2   Sq Epi: x / Non Sq Epi: x / Bacteria: x        Culture - Urine (collected 19 Mar 2020 05:22)  Source: .Urine Clean Catch (Midstream)  Final Report (20 Mar 2020 01:09):    No growth    Culture - Blood (collected 19 Mar 2020 01:51)  Source: .Blood Blood-Venous  Preliminary Report (20 Mar 2020 02:01):    No growth to date.    Culture - Blood (collected 19 Mar 2020 01:51)  Source: .Blood Blood-Peripheral  Preliminary Report (20 Mar 2020 02:01):    No growth to date.        RADIOLOGY & ADDITIONAL TESTS:  Results Reviewed:   Imaging Personally Reviewed:  Electrocardiogram Personally Reviewed:    COORDINATION OF CARE:  Care Discussed with Consultants/Other Providers [Y/N]: Y  Prior or Outpatient Records Reviewed [Y/N]: Y

## 2020-03-20 NOTE — DISCHARGE NOTE PROVIDER - NSDCMRMEDTOKEN_GEN_ALL_CORE_FT
acetaminophen 325 mg oral tablet: 2 tab(s) orally every 6 hours, As needed, Temp greater or equal to 38C (100.4F)  albuterol 90 mcg/inh inhalation aerosol with adapter:   levothyroxine 50 mcg (0.05 mg) oral tablet: 1 tab(s) orally once a day acetaminophen 325 mg oral tablet: 2 tab(s) orally every 6 hours, As needed, Temp greater or equal to 38C (100.4F)  albuterol 90 mcg/inh inhalation aerosol with adapter:   benzonatate 100 mg oral capsule: 1 cap(s) orally every 8 hours, As needed, Cough  levothyroxine 50 mcg (0.05 mg) oral tablet: 1 tab(s) orally once a day

## 2020-03-20 NOTE — DISCHARGE NOTE PROVIDER - HOSPITAL COURSE
54F w/hypothyroidism presents for shortness of breath, cough, and reported fevers, admitted sepsis 2/2 presumed viral PNA and COVID-19 rule out. Found to be + COVID.             Sepsis.  2/2 COVID-19 infection     CT with b/l panlobar, predominantly peripheral groundglass opacities and areas of consolidation most pronounced in the left upper lobe    -DC abx, supportive care for viral PNA     -Bld Cx (3/18): NGTD    -UCx (3/18): NGTD    -RVP & Legionella negative    -Mildly elevated inflammatory markers    -Tylenol for fever.        DC planning to home for self quarantine for 2 weeks. Pt has daughter at home to help her in case of emergencies or if symptoms worsen.         Pt with fever of 101 earlier today, pt continues to deny SOB, pt satting well on RA, discussed with Dr. Atkinson, pt cleared for discharge home with instructions to return to the hospital if her symptoms worsen.

## 2020-03-20 NOTE — PROGRESS NOTE ADULT - PROBLEM SELECTOR PLAN 4
Dispo:   1.  Name of PCP: Dr. Bardales ([phone])  2.  PCP Contacted on Admission: [x] Y, 3/19/20    3.  PCP contacted at Discharge: [ ] Y    [ ] N    [ ] N/A  4.  Post-Discharge Appointment Date and Location:  5.  Summary of Handoff given to PCP:

## 2020-03-20 NOTE — DISCHARGE NOTE PROVIDER - NSDCCPCAREPLAN_GEN_ALL_CORE_FT
PRINCIPAL DISCHARGE DIAGNOSIS  Diagnosis: Infection due to 2019 novel coronavirus  Assessment and Plan of Treatment: Self-Isolate for 2 weeks. Please return to the hospital if your symtoms worsen or if you experience shortness of breath.      SECONDARY DISCHARGE DIAGNOSES  Diagnosis: Hypothyroidism  Assessment and Plan of Treatment: Repeat thyroid function tests as outpatient.

## 2020-03-20 NOTE — PROGRESS NOTE ADULT - ASSESSMENT
54F w/hypothyroidism presents for shortness of breath, cough, and reported fevers, admitted sepsis 2/2 presumed viral PNA and COVID-19 rule out.

## 2020-03-20 NOTE — DISCHARGE NOTE NURSING/CASE MANAGEMENT/SOCIAL WORK - NSDCPNINST_GEN_ALL_CORE
please read COVID discharge paperwork and self-isolate as much as possible. if you have increasing difficulty breathing, come back to the emergency room.

## 2020-03-20 NOTE — PROGRESS NOTE ADULT - PROBLEM SELECTOR PLAN 1
+fevers and bandemia on admission 2/2 presumed PNA  CT with b/l panlobar, predominantly peripheral groundglass opacities and areas of consolidation most pronounced in the left upper lobe  -C/w Cef/Azithro for CAP coverage (3/18-3/22)  -f/u COVID PCR  -Bld Cx (3/18): NGTD  -UCx (3/18): NGTD  -RVP & Legionella negative  -Mildly elevated inflammatory markers 2/2 COVID-19 infection   CT with b/l panlobar, predominantly peripheral groundglass opacities and areas of consolidation most pronounced in the left upper lobe  -DC abx, supportive care for viral PNA   -Bld Cx (3/18): NGTD  -UCx (3/18): NGTD  -RVP & Legionella negative  -Mildly elevated inflammatory markers  -Tylenol for fever

## 2020-03-21 ENCOUNTER — TRANSCRIPTION ENCOUNTER (OUTPATIENT)
Age: 55
End: 2020-03-21

## 2020-03-23 DIAGNOSIS — Z71.89 OTHER SPECIFIED COUNSELING: ICD-10-CM

## 2020-03-24 LAB
CULTURE RESULTS: SIGNIFICANT CHANGE UP
CULTURE RESULTS: SIGNIFICANT CHANGE UP
SPECIMEN SOURCE: SIGNIFICANT CHANGE UP
SPECIMEN SOURCE: SIGNIFICANT CHANGE UP

## 2020-05-15 ENCOUNTER — TRANSCRIPTION ENCOUNTER (OUTPATIENT)
Age: 55
End: 2020-05-15

## 2020-10-29 ENCOUNTER — RESULT REVIEW (OUTPATIENT)
Age: 55
End: 2020-10-29

## 2021-08-04 PROBLEM — E03.9 HYPOTHYROIDISM, UNSPECIFIED: Chronic | Status: ACTIVE | Noted: 2020-03-18

## 2021-08-31 ENCOUNTER — APPOINTMENT (OUTPATIENT)
Dept: ORTHOPEDIC SURGERY | Facility: CLINIC | Age: 56
End: 2021-08-31
Payer: OTHER MISCELLANEOUS

## 2021-08-31 ENCOUNTER — FORM ENCOUNTER (OUTPATIENT)
Age: 56
End: 2021-08-31

## 2021-08-31 PROCEDURE — 99204 OFFICE O/P NEW MOD 45 MIN: CPT

## 2021-08-31 PROCEDURE — 73562 X-RAY EXAM OF KNEE 3: CPT | Mod: LT

## 2021-08-31 PROCEDURE — 99072 ADDL SUPL MATRL&STAF TM PHE: CPT

## 2021-08-31 RX ORDER — NAPROXEN 500 MG/1
500 TABLET ORAL
Qty: 30 | Refills: 0 | Status: ACTIVE | COMMUNITY
Start: 2021-05-18

## 2021-08-31 RX ORDER — DOCUSATE SODIUM 50 MG AND SENNOSIDES 8.6 MG 8.6; 5 MG/1; MG/1
8.6-5 TABLET, FILM COATED ORAL
Qty: 30 | Refills: 0 | Status: ACTIVE | COMMUNITY
Start: 2021-05-12

## 2021-08-31 RX ORDER — FAMOTIDINE 40 MG/1
40 TABLET, FILM COATED ORAL
Qty: 30 | Refills: 0 | Status: ACTIVE | COMMUNITY
Start: 2021-07-19

## 2021-08-31 RX ORDER — CYCLOBENZAPRINE HYDROCHLORIDE 10 MG/1
10 TABLET, FILM COATED ORAL
Qty: 10 | Refills: 0 | Status: ACTIVE | COMMUNITY
Start: 2021-05-18

## 2021-08-31 RX ORDER — PSYLLIUM HUSK 3 G/5.8 G
58.6 POWDER (GRAM) ORAL
Qty: 426 | Refills: 0 | Status: ACTIVE | COMMUNITY
Start: 2021-05-12

## 2021-08-31 RX ORDER — CELECOXIB 200 MG/1
200 CAPSULE ORAL DAILY
Qty: 30 | Refills: 2 | Status: ACTIVE | COMMUNITY
Start: 2021-08-31 | End: 1900-01-01

## 2021-08-31 RX ORDER — FLUTICASONE PROPIONATE 50 UG/1
50 SPRAY, METERED NASAL
Qty: 16 | Refills: 0 | Status: ACTIVE | COMMUNITY
Start: 2021-05-12

## 2021-08-31 RX ORDER — ASPIRIN 81 MG/1
81 TABLET, COATED ORAL
Qty: 30 | Refills: 0 | Status: ACTIVE | COMMUNITY
Start: 2021-08-20

## 2021-08-31 RX ORDER — LEVOTHYROXINE SODIUM 0.03 MG/1
25 TABLET ORAL
Qty: 10 | Refills: 0 | Status: ACTIVE | COMMUNITY
Start: 2021-08-20

## 2021-08-31 RX ORDER — LEVOTHYROXINE SODIUM 0.05 MG/1
50 TABLET ORAL
Qty: 30 | Refills: 0 | Status: ACTIVE | COMMUNITY
Start: 2021-08-20

## 2021-08-31 RX ORDER — PSYLLIUM HUSK (WITH SUGAR) 3 G/12 G
28.3 POWDER (GRAM) ORAL
Qty: 368 | Refills: 0 | Status: ACTIVE | COMMUNITY
Start: 2021-08-20

## 2021-08-31 RX ORDER — ACETAMINOPHEN EXTRA STRENGTH 500 MG/1
500 TABLET ORAL
Qty: 30 | Refills: 0 | Status: ACTIVE | COMMUNITY
Start: 2021-08-07

## 2021-08-31 RX ORDER — MULTIVIT-MIN/FOLIC/VIT K/LYCOP 400-300MCG
25 MCG TABLET ORAL
Qty: 30 | Refills: 0 | Status: ACTIVE | COMMUNITY
Start: 2021-08-20

## 2021-08-31 NOTE — PHYSICAL EXAM
[de-identified] : Left knee when compared to the right today he has more soft tissue swelling is noted chest tenderness compression of the patellofemoral articulation. Range of motion is 5-95°. There is no effusion Quad tone is good extensor mechanism is intact. The knee is stable to AP stress varus valgus stress both in full extension and 90° of flexion. [de-identified] : AP standing individual lateral and sunrise views were obtained showing no evidence of obvious fracture of the left knee with special attention paid to the lateral plateau as well as patella. Joint spaces are fairly well maintained.

## 2021-08-31 NOTE — REASON FOR VISIT
[Initial Visit] : an initial visit for [Workers' Comp: Date of Injury: _______] : This visit is related to worker's compensation. Date of Injury: [unfilled] [FreeTextEntry2] : LEFT KNEE PAIN - HAD ACCIDENT AT WORK ON 07/24/21 FELL AT WORK- SENT FOR NEW XRAYS

## 2021-08-31 NOTE — HISTORY OF PRESENT ILLNESS
[de-identified] : First visit for this patient she is she here because of an injury she sustained in July 24 when employed as a . Apparently she was moving a heavy object and fell onto the object striking her left knee patient went to a [facility].. where she had x-rays taken until there was no fracture. Patient has not been working since the injury she walks with a limp has fairly severe left knee anterior pain. She takes Tylenol for pain relief but is not to be improving. She has been injured 6 weeks now.

## 2021-09-09 ENCOUNTER — FORM ENCOUNTER (OUTPATIENT)
Age: 56
End: 2021-09-09

## 2021-09-09 ENCOUNTER — APPOINTMENT (OUTPATIENT)
Dept: ORTHOPEDIC SURGERY | Facility: CLINIC | Age: 56
End: 2021-09-09
Payer: OTHER MISCELLANEOUS

## 2021-09-09 PROCEDURE — 99072 ADDL SUPL MATRL&STAF TM PHE: CPT

## 2021-09-09 PROCEDURE — 99214 OFFICE O/P EST MOD 30 MIN: CPT

## 2021-09-09 RX ORDER — CELECOXIB 200 MG/1
200 CAPSULE ORAL DAILY
Qty: 30 | Refills: 2 | Status: ACTIVE | COMMUNITY
Start: 2021-09-09 | End: 1900-01-01

## 2021-09-09 NOTE — PHYSICAL EXAM
[de-identified] : .Left knee exam today patient does have some mild medial joint line tenderness there is no obvious effusion range of motion 0-120° with significant improvement previous visit. Knee is stable to AP stress varus stress with full extension and 90° of flexion.

## 2021-09-09 NOTE — DISCUSSION/SUMMARY
[Medication Risks Reviewed] : Medication risks reviewed [de-identified] : Patient that was not able to obtain the Celebrex was prescribed to her on last visit. We'll re re prescribe she'll be taking 200 mg p.o. b.i.d. for 6 day course follow up with me in 2 weeks' time. I believe this is mostly a bone bruise I should improve between now and next visit.

## 2021-09-09 NOTE — HISTORY OF PRESENT ILLNESS
[de-identified] : Patient returns today with continued complaints of left knee pain she had a recent MRI which I reviewed personally and there are no significant findings no evidence of meniscal pathology no evidence of any significant chondral injury there is some evidence of some mild bone bruising.

## 2021-09-16 ENCOUNTER — FORM ENCOUNTER (OUTPATIENT)
Age: 56
End: 2021-09-16

## 2021-09-21 ENCOUNTER — APPOINTMENT (OUTPATIENT)
Dept: ORTHOPEDIC SURGERY | Facility: CLINIC | Age: 56
End: 2021-09-21
Payer: OTHER MISCELLANEOUS

## 2021-09-21 PROCEDURE — 99072 ADDL SUPL MATRL&STAF TM PHE: CPT

## 2021-09-21 PROCEDURE — 99214 OFFICE O/P EST MOD 30 MIN: CPT

## 2021-09-21 RX ORDER — IBUPROFEN 800 MG/1
800 TABLET, FILM COATED ORAL 3 TIMES DAILY
Qty: 60 | Refills: 0 | Status: ACTIVE | COMMUNITY
Start: 2021-09-21 | End: 1900-01-01

## 2021-09-21 RX ORDER — MELOXICAM 7.5 MG/1
7.5 TABLET ORAL
Qty: 90 | Refills: 0 | Status: ACTIVE | COMMUNITY
Start: 2021-09-21 | End: 1900-01-01

## 2021-09-21 NOTE — HISTORY OF PRESENT ILLNESS
[de-identified] : Patient returns today again she was unable to get Celebrex because of her insurance not authorizing it. She feels slightly improved and does not feel she is able to return to work she is complaining of pain in the posterior aspect of the affected left knee. Recall she had a recent MRI which showed no significant internal derangement of the meniscus or chondral injury but there was evidence of a resolving bone bruise.

## 2021-09-21 NOTE — REASON FOR VISIT
[Initial Visit] : an initial visit for [FreeTextEntry2] : WORKERS COMP CASE: f/u left knee pain- still experiencing pain. Recently Began PT

## 2021-09-21 NOTE — PHYSICAL EXAM
[de-identified] : .Left knee exam today patient does have some mild medial joint line tenderness there is no obvious effusion range of motion 0-120° with significant improvement previous visit. Knee is stable to AP stress varus stress with full extension and 90° of flexion.

## 2021-09-21 NOTE — DISCUSSION/SUMMARY
[Medication Risks Reviewed] : Medication risks reviewed [de-identified] : Patient I. discussed our options we will switch her over to generic meloxicam. Because her work requires excessive standing we'll keep her out of her in 2 weeks she'll continue with physical therapy I'll for potential return to work date

## 2021-09-23 ENCOUNTER — FORM ENCOUNTER (OUTPATIENT)
Age: 56
End: 2021-09-23

## 2021-10-04 ENCOUNTER — RESULT REVIEW (OUTPATIENT)
Age: 56
End: 2021-10-04

## 2021-10-07 ENCOUNTER — APPOINTMENT (OUTPATIENT)
Dept: ORTHOPEDIC SURGERY | Facility: CLINIC | Age: 56
End: 2021-10-07
Payer: OTHER MISCELLANEOUS

## 2021-10-07 PROCEDURE — 99214 OFFICE O/P EST MOD 30 MIN: CPT

## 2021-10-07 PROCEDURE — 99072 ADDL SUPL MATRL&STAF TM PHE: CPT

## 2021-10-07 NOTE — DISCUSSION/SUMMARY
[de-identified] : I believe the patient should be able to return to work it seems that her bone bruise has resolved in terms of symptoms. I advised her for the first few days that she does return to work take an anti-inflammatory on the way to work and to ice when she gets home patient will return if symptoms resume next week.

## 2021-10-07 NOTE — PHYSICAL EXAM
[de-identified] : .Left knee exam today patient does have minimal medial joint line tenderness there is no obvious effusion range of motion 0-120° with significant improvement previous visit. Knee is stable to AP stress varus stress with full extension and 90° of flexion.

## 2021-10-07 NOTE — REASON FOR VISIT
[Follow-Up Visit] : a follow-up visit for [Workers' Comp: Date of Injury: _______] : This visit is related to worker's compensation. Date of Injury: [unfilled] [FreeTextEntry2] : RIGHT KNEE PAIN F/U

## 2021-10-07 NOTE — HISTORY OF PRESENT ILLNESS
[de-identified] : Patient returns today she is returning to work next week she shear for final check. She states that she feels that she should be able to return to work he has been taking her nonsteroidal anti-inflammatory prescribed the last visit as needed.

## 2022-08-12 NOTE — DISCUSSION/SUMMARY
[Medication Risks Reviewed] : Medication risks reviewed [de-identified] : Patient I discussed our options 6 weeks of limping and pain is unusual. She may suffered a severe bone contusion. She'll be placed on Celebrex 200 mg p.o. b.i.d. for 6 day course and be taken thereafter as needed. She'll be given a cane. Patient will also be given a neoprene support and sent for an MRI to help evaluate for occult fracture versus other internal derangement. The reasonable risks and benefits of the medication discussed in detail. Size Of Lesion In Cm: 0.7

## 2024-10-28 NOTE — ED ADULT NURSE NOTE - NSSUHOSCREENINGYN_ED_ALL_ED
10:44am - Sent snf order, 142, pasrr and chest xray to [facility] via [employer].     10/28/24 1049   Post-Acute Status   Post-Acute Authorization Placement   Discharge Plan   Discharge Plan A Skilled Nursing Facility   Discharge Plan B Skilled Nursing Facility        Yes - the patient is able to be screened
